# Patient Record
(demographics unavailable — no encounter records)

---

## 2025-05-05 NOTE — HISTORY OF PRESENT ILLNESS
[FreeTextEntry1] : Patient followed by outside urologist for BPH, on Flomax QD, doing CIC BID for incomplete emptying, UDS shows bladder capacity 480cc, bladder instability; borderline bladder outlet obstruction; PVR 300cc. TURP suggested and pending. PVR 350ml today Left 5mm renal stone on CT with mild bilateral hydroureter and thickening bladder wall PSA 1.14 in Oct 2024 Microscopic hematuria, denied gross hematuria. Asymptomatic  Assessment: Retention.  Voiding spontaneously.Increase Flomax to twice daily.  Add Proscar.  Follow-up 1 Month.  Voiding diary.  Please refer to URO Consult Note.

## 2025-05-05 NOTE — ADDENDUM
[FreeTextEntry1] : Entered by Winston De La Cruz, acting as scribe for Dr. Dilip Acosta. The documentation recorded by the scribe accurately reflects the service I personally performed and the decisions made by me.

## 2025-05-05 NOTE — LETTER BODY
[FreeTextEntry1] : Arvind Grajeda MD [address] [place] [address] Phone: [phone]  Dear Dr. Grajeda,   Reason for Visit: BPH. Urinary retention   This is a 67 year-old male with symptoms of BPH and urinary retention. Patient is here today for evaluation. Patient reports taking Flomax regularly with no side effects or difficulties. He reports progressive urinary symptoms with medication. He reports performing CIC BID. His UDS testing demonstrated a bladder capacity of 480 cc with borderline bladder obstruction. His urologist Dr. Kristopher Chapa recommend he proceed with bladder procedure.His previous CT scan demonstrated a left 5 mm renal stone with mild bilateral hydroureteronephrosis and bladder wall thickening. He denies any hematuria or urinary incontinence. His symptoms are aggravated by hydration. He denies any alleviating factors. He denies any pain. All other review of systems are negative. He has no cancer in his family medical history. He has no previous surgical history. Past medical history, family history and social history were inquired and were noncontributory to current condition. The patient does not use tobacco or drink alcohol. Medications and allergies were reviewed. He has no known allergies to medication. His daughter is a surgical resident at [facility] .  She also participated in the meeting today by phone.  On examination, the patient is a well-appearing male in no acute distress. He is alert and oriented follows commands. He has normal mood and affect. He is normocephalic. Neck is supple. Oral no thrush Respirations are unlabored. His abdomen is soft and nontender. Bladder is nonpalpable. No CVA tenderness. Neurologically he is grossly intact. No peripheral edema. Skin without gross abnormality.     Post-void residual on bladder scan today was 350 cc.    ASSESSMENT: BPH, urinary retention.   I counseled the patient on the various etiology of his symptoms. I discussed the natural history of BPH and the treatment options available. I discussed the options of conservative management with fluid in dietary restrictions, herbal therapy, medical therapy, and minimally invasive procedures. He has a history of urinary retention and incomplete bladder emptying. His PVR today was 350 cc. Patient is voiding spontaneously. However, patient has not met treatment goals. I recommended he increase Flomax to BID and add Proscar to the regiment. I discussed the potential side effects of the medication. I counseled the patient on its use and side effects. If the patient develops any side effects, the patient will discontinue medication and contact me. I also recommended the patient to keep a voiding diary. He will obtain urinalysis, urine culture, and urine cytology to evaluate for infections and high grade urothelial carcinoma. Risks and alternatives were discussed. I answered the patient's questions. The patient will follow-up as directed and will contact me with any questions or concerns.   Plan: Increase Flomax to BID. Add Proscar. Urinalysis. Urine culture and cytology. Voiding diary. Follow-up in 1 month. Consider bladder outlet procedure.

## 2025-06-05 NOTE — HISTORY OF PRESENT ILLNESS
[FreeTextEntry1] : F/U for BPH with history of urinary retention, on Flomax BID and Proscar, doing CIC BID. Reports doing well during the day, but nocturia for 3-4 times.  (350 on last visit) F/U for UTI, positive UC in May 2025, treated with Amoxicillin, asymptomatic currently.  Concerning weight lost since starting Proscar (140lb in May 5, 135 in June). Blood work and chest x-ray were unremarkable with PCP.   PSA 1.53, Creatinine 0.92, GFR 91 in June 2025     Patient has not meet treatment goal. cysto UDS   Please refer to URO Consult note

## 2025-06-05 NOTE — LETTER BODY
[FreeTextEntry1] : Arvind Grajeda MD [address] [place] [address] Phone: [phone]  Dear Dr. Grajeda,  Reason for Visit: BPH. Urinary retention. UTI.  This is a 67 year-old male with symptoms of BPH and urinary retention. Patient follows up today. Since he was last seen, patient reports taking Flomax BID and Proscar regularly with no side effects or difficulties. However, he does report unintentional weight loss since beginning Proscar (5 lbs in the last month). His blood work and chest x-ray were unremarkable with his PCP. He reports progressive urinary symptoms despite medication. He reports performing CIC BID. He reports doing well during the day but then reports 3-4 episodes of nocturia per night. The patient also follows up for a UTI, after a positive urine culture in May 2025. He was treated with Amoxicillin and is currently asymptomatic. He denies any hematuria or urinary incontinence. His symptoms are aggravated by hydration. He denies any alleviating factors. He denies any pain. All other review of systems are negative. He has no cancer in his family medical history. He has no previous surgical history. Past medical history, family history and social history were inquired and were noncontributory to current condition. The patient does not use tobacco or drink alcohol. Medications and allergies were reviewed. He has no known allergies to medication.   On examination, the patient is a well-appearing male in no acute distress. He is alert and oriented follows commands. He has normal mood and affect. He is normocephalic. Neck is supple. Oral no thrush Respirations are unlabored. His abdomen is soft and nontender. Bladder is nonpalpable. No CVA tenderness. Neurologically he is grossly intact. No peripheral edema. Skin without gross abnormality.  His PVR today was 290cc, which is decreasing from 350 cc on his last visit.  His BMP demonstrated normal renal functions, creatinine 0.92. His PSA was 1.53, which is within normal limits. GFR 91 in June 2025. His urinalysis was unremarkable. His urine culture was negative.  ASSESSMENT: BPH, urinary retention. Previous UTI.   I counseled the patient on the various etiology of his symptoms. I discussed the natural history of BPH and the treatment options available. I discussed the options of conservative management with fluid in dietary restrictions, herbal therapy, medical therapy, and minimally invasive procedures. He has a history of urinary retention and incomplete bladder emptying. His PVR today was 290cc, which is decreased from 350cc on his last visit. Patient is voiding spontaneously. However, patient has not met treatment goals. I recommended he obtain a cystoscopy and UDS. I counseled the patient regarding the procedure. The risks and benefits were discussed. Alternatives were given. I answered the patient's questions. The patient will take the necessary preparations for the procedure. I also recommended that the patient continue taking Flomax BID and Proscar as directed. I renewed the patient's prescription for Flomax BID and Proscar today. I encouraged the patient to continue  medications regularly as directed. In terms of his previous UTI, he was treated with amoxicillin and is currently asymptomatic. He will obtain urinalysis, urine culture, and urine cytology to evaluate for infections and high grade urothelial carcinoma. Risks and alternatives were discussed. I answered the patient's questions. The patient will follow-up as directed and will contact me with any questions or concerns.  Plan: Continue Flomax BID and Proscar. Cystoscopy. UDS. Urinalysis. Urine culture and cytology. Follow-up as directed.   I spent 30-minutes time today on all issues related to this patient on today date of service including non face to face time.

## 2025-06-05 NOTE — ADDENDUM
[FreeTextEntry1] :  Entered by Paula Dill, acting as scribe for Dr Dilip Acosta. The documentation recorded by the scribe accurately reflects the service I personally performed and the decisions made by me.

## 2025-06-05 NOTE — LETTER BODY
[FreeTextEntry1] : Arvind Grajeda MD [address] [place] [address] Phone: [phone]  Dear Dr. Grajeda,  Reason for Visit: BPH. Urinary retention. UTI.  This is a 67 year-old male with symptoms of BPH and urinary retention. Patient follows up today. Since he was last seen, patient reports taking Flomax BID and Proscar regularly with no side effects or difficulties. However, he does report unintentional weight loss since beginning Proscar (5 lbs in the last month). His blood work and chest x-ray were unremarkable with his PCP. He reports progressive urinary symptoms despite medication. He reports performing CIC BID. He reports doing well during the day but then reports 3-4 episodes of nocturia per night. The patient also follows up for a UTI, after a positive urine culture in May 2025. He was treated with Amoxicillin and is currently asymptomatic. He denies any hematuria or urinary incontinence. His symptoms are aggravated by hydration. He denies any alleviating factors. He denies any pain. All other review of systems are negative. He has no cancer in his family medical history. He has no previous surgical history. Past medical history, family history and social history were inquired and were noncontributory to current condition. The patient does not use tobacco or drink alcohol. Medications and allergies were reviewed. He has no known allergies to medication.   On examination, the patient is a well-appearing male in no acute distress. He is alert and oriented follows commands. He has normal mood and affect. He is normocephalic. Neck is supple. Oral no thrush Respirations are unlabored. His abdomen is soft and nontender. Bladder is nonpalpable. No CVA tenderness. Neurologically he is grossly intact. No peripheral edema. Skin without gross abnormality.  His PVR today was 290cc, which is decreasing from 350 cc on his last visit.  His BMP demonstrated normal renal functions, creatinine 0.92. His PSA was 1.53, which is within normal limits. GFR 91 in June 2025. His urinalysis was unremarkable. His urine culture was negative.  ASSESSMENT: BPH, urinary retention. Previous UTI.   I counseled the patient on the various etiology of his symptoms. I discussed the natural history of BPH and the treatment options available. I discussed the options of conservative management with fluid in dietary restrictions, herbal therapy, medical therapy, and minimally invasive procedures. He has a history of urinary retention and incomplete bladder emptying. His PVR today was 290cc, which is decreased from 350cc on his last visit. Patient is voiding spontaneously. However, patient has not met treatment goals. I recommended he obtain a cystoscopy and UDS. I counseled the patient regarding the procedure. The risks and benefits were discussed. Alternatives were given. I answered the patient's questions. The patient will take the necessary preparations for the procedure. I also recommended that the patient continue taking Flomax BID and Proscar as directed. I renewed the patient's prescription for Flomax BID and Proscar today. I encouraged the patient to continue  medications regularly as directed. In terms of his previous UTI, he was treated with amoxicillin and is currently asymptomatic. He will obtain urinalysis, urine culture, and urine cytology to evaluate for infections and high grade urothelial carcinoma. Risks and alternatives were discussed. I answered the patient's questions. The patient will follow-up as directed and will contact me with any questions or concerns.  Plan: Continue Flomax BID and Proscar. Cystoscopy. UDS. Urinalysis. Urine culture and cytology. Follow-up as directed.   I spent 30-minutes time today on all issues related to this patient on today date of service including non face to face time.

## 2025-06-21 NOTE — LETTER BODY
[FreeTextEntry1] : Arvind Grajeda MD [address] [place] [address] Phone: [phone]  Dear Dr. Grajeda,  Reason for Visit: BPH. Urinary retention. UTI.  This is a 67 year-old male with symptoms of BPH and urinary retention. Patient is here today for cystoscopy, UDS, and follow up. Since he was last seen, patient reports taking Flomax BID and Proscar regularly with no side effects or difficulties. His cystoscopy today demonstrated 2 cm bilobar obstruction and 4+ trabeculation. His UDS demonstrated a possible neurogenic bladder. He has decreased detrusor function. His PSA was noted recently at 1.53. All other review of systems are negative. He has no cancer in his family medical history. He has no previous surgical history. Past medical history, family history and social history were inquired and were noncontributory to current condition. The patient does not use tobacco or drink alcohol. Medications and allergies were reviewed. He has no known allergies to medication.  On examination, the patient is a well-appearing male in no acute distress. He is alert and oriented follows commands. He has normal mood and affect. He is normocephalic. Neck is supple. Oral no thrush Respirations are unlabored. His abdomen is soft and nontender. Bladder is nonpalpable. No CVA tenderness. Neurologically he is grossly intact. No peripheral edema. Skin without gross abnormality.  His BMP demonstrated normal renal functions, creatinine 0.92. His PSA was 1.53, which is within normal limits. GFR 91 in June 2025. His urinalysis was unremarkable. His urine culture was negative.  ASSESSMENT: BPH, urinary retention. Previous UTI.  I counseled the patient on the various etiology of his symptoms. In terms of his BPH, his cystoscopy today demonstrated 3cm bilobar obstruction and 4+ trabeculation. His UDS demonstrated decreased detrusor function and a possible neurogenic bladder. The risk of persistent urinary retention despite bladder procedure was discussed. I recommended that he consider a GreenLight laser vaporization of prostate. I counseled the patient regarding the procedure. The risks and benefits were discussed. Alternatives were given. I answered the patient's questions. The patient will take the necessary preparations for the procedure. I also recommended that the patient continue taking Flomax BID and Proscar as directed. Risks and alternatives were discussed. I answered the patient's questions. The patient will follow-up as directed and will contact me with any questions or concerns.  Plan: Continue Flomax BID and Proscar. Consider GreenLight laser vaporization of prostate. Follow-up as directed.  I spent 30-minutes time today on all issues related to this patient on today date of service including non face to face time.

## 2025-06-21 NOTE — LETTER BODY
[FreeTextEntry1] : Arvind Grajeda MD [address] [place] [address] Phone: [phone]  Dear Dr. Grajeda,  Reason for Visit: BPH. Urinary retention. UTI.  This is a 67 year-old male with symptoms of BPH and urinary retention. Patient is here today for cystoscopy, UDS, and follow up. Since he was last seen, patient reports taking Flomax BID and Proscar regularly with no side effects or difficulties. His cystoscopy today demonstrated 2 cm bilobar obstruction and 4+ trabeculation. His UDS demonstrated a possible neurogenic bladder. He has decreased detrusor function. His PSA was noted recently at 1.53. All other review of systems are negative. He has no cancer in his family medical history. He has no previous surgical history. Past medical history, family history and social history were inquired and were noncontributory to current condition. The patient does not use tobacco or drink alcohol. Medications and allergies were reviewed. He has no known allergies to medication.  On examination, the patient is a well-appearing male in no acute distress. He is alert and oriented follows commands. He has normal mood and affect. He is normocephalic. Neck is supple. Oral no thrush Respirations are unlabored. His abdomen is soft and nontender. Bladder is nonpalpable. No CVA tenderness. Neurologically he is grossly intact. No peripheral edema. Skin without gross abnormality.  His BMP demonstrated normal renal functions, creatinine 0.92. His PSA was 1.53, which is within normal limits. GFR 91 in June 2025. His urinalysis was unremarkable. His urine culture was negative.  ASSESSMENT: BPH, urinary retention. Previous UTI.  I counseled the patient on the various etiology of his symptoms. In terms of his BPH, his cystoscopy today demonstrated 3cm bilobar obstruction and 4+ trabeculation. His UDS demonstrated decreased detrusor function and a possible neurogenic bladder. The risk of persistent urinary retention despite bladder procedure was discussed. I recommended that he consider a GreenLight laser vaporization of prostate. I counseled the patient regarding the procedure. The risks and benefits were discussed. Alternatives were given. I answered the patient's questions. The patient will take the necessary preparations for the procedure. I also recommended that the patient continue taking Flomax BID and Proscar as directed. Risks and alternatives were discussed. I answered the patient's questions. The patient will follow-up as directed and will contact me with any questions or concerns.  Plan: Continue Flomax BID and Proscar. Consider GreenLight laser vaporization of prostate. Follow-up as directed.  I spent 30-minutes time today on all issues related to this patient on today date of service including non face to face time.

## 2025-06-21 NOTE — LETTER BODY
[FreeTextEntry1] : Arvind Grajeda MD [address] [place] [address] Phone: [phone]  Dear Dr. Grajeda,  Reason for Visit: BPH. Urinary retention. UTI.  This is a 67 year-old male with symptoms of BPH and urinary retention. Patient is here today for cystoscopy, UDS, and follow up. Since he was last seen, patient reports taking Flomax BID and Proscar regularly with no side effects or difficulties. His cystoscopy today demonstrated 2 cm bilobar obstruction and 4+ trabeculation. His UDS demonstrated a possible neurogenic bladder. He has decreased detrusor function. His PSA was noted recently at 1.53. All other review of systems are negative. He has no cancer in his family medical history. He has no previous surgical history. Past medical history, family history and social history were inquired and were noncontributory to current condition. The patient does not use tobacco or drink alcohol. Medications and allergies were reviewed. He has no known allergies to medication.  On examination, the patient is a well-appearing male in no acute distress. He is alert and oriented follows commands. He has normal mood and affect. He is normocephalic. Neck is supple. Oral no thrush Respirations are unlabored. His abdomen is soft and nontender. Bladder is nonpalpable. No CVA tenderness. Neurologically he is grossly intact. No peripheral edema. Skin without gross abnormality.  His BMP demonstrated normal renal functions, creatinine 0.92. His PSA was 1.53, which is within normal limits. GFR 91 in June 2025. His urinalysis was unremarkable. His urine culture was negative.  ASSESSMENT: BPH, urinary retention. Previous UTI.  I counseled the patient on the various etiology of his symptoms. In terms of his BPH, his cystoscopy today demonstrated 3cm bilobar obstruction and 4+ trabeculation. His UDS demonstrated decreased detrusor function and a possible neurogenic bladder. The risk of persistent urinary retention despite bladder procedure was discussed. I recommended that he consider a GreenLight laser vaporization of prostate. I counseled the patient regarding the procedure. The risks and benefits were discussed. Alternatives were given. I answered the patient's questions. The patient will take the necessary preparations for the procedure. I also recommended that the patient continue taking Flomax BID and Proscar as directed. Risks and alternatives were discussed. I answered the patient's questions. The patient will follow-up as directed and will contact me with any questions or concerns.  Plan: Continue Flomax BID and Proscar. Consider GreenLight laser vaporization of prostate. Follow-up as directed.  I spent 30-minutes time today on all issues related to this patient on today date of service including non face to face time.

## 2025-06-21 NOTE — HISTORY OF PRESENT ILLNESS
[FreeTextEntry1] : bph 2cm bilobar obstruction  4+ trabeculation.  PSA 1.53.  Patient may have neurogenic bladder.  He has decreased detrusor function.  The risk of persistent urine tension despite bladder procedure was discussed.    Plan consider Greenlight laser vaporization of prostate   Please refer to URO Consult note